# Patient Record
(demographics unavailable — no encounter records)

---

## 2025-07-01 NOTE — HISTORY OF PRESENT ILLNESS
[FreeTextEntry1] : 56 y/o female with h/o psoriasis who presents for f/up today   last seen 6/24  started mounjaro 3/25 - lost 8 lbs   -Calcium score - 3/25 Cardiac: 1.  The calcium score is 0 Agatston units. . 2.  Aortic  valve calcification.  . Non-cardiac: No significant findings.  -CTA cor 10/22 Cardiac: 1. The calcium score is 0 Agatston units. 2. Angiographically normal coronary arteries Non-cardiac: None  no cp, sob, syncope, lh, edema, orthopnea, pnd, palpitations    exercises - tennis twice weekly 1.5 hour/time, boxes    diet healthy  stress level high  on HRT for 4 years  sleep 6 hours  exercises   PMH/PSH: psoriasis wrist surgery nasal surgery    ALL nkda   MEDS: enbrel estradiol 1 mg tablet mounjaro progesterone 100 mg     SH: former smoker socially social etoh no drugs works in [employer] from [place]  2 children - healthy    FH:  mother - htn, alive, ppm, 80s father - htn, hl, alive 80s 2 sisters/1 brother - alive, healthy

## 2025-07-01 NOTE — DISCUSSION/SUMMARY
[Patient] : the patient [___ Month(s)] : in [unfilled] month(s) [EKG obtained to assist in diagnosis and management of assessed problem(s)] : EKG obtained to assist in diagnosis and management of assessed problem(s) [FreeTextEntry1] : 56 y/o female with h/o psoriasis who presents for f/up today   -Calcium score - 3/25 Cardiac: 1.  The calcium score is 0 Agatston units. . 2.  Aortic valve calcification.  . Non-cardiac: No significant findings.  -CTA cor 10/22 Cardiac: 1. The calcium score is 0 Agatston units. 2. Angiographically normal coronary arteries Non-cardiac: None  -ordered ekg today - SB, normal intervals, no st/t changes  -ordered echo for murmur  -on HRT  -labs 2025 from pcp reviewed, lipids, a1c ordered   -LpA normal  -counseled on cvd risk factors  -f/up 6 months for cvd risk factors    I have spent 40 minutes reviewing labs, records, tests and discussing cvd risk factors, cp evaluation.